# Patient Record
Sex: FEMALE | Race: ASIAN | NOT HISPANIC OR LATINO
[De-identification: names, ages, dates, MRNs, and addresses within clinical notes are randomized per-mention and may not be internally consistent; named-entity substitution may affect disease eponyms.]

---

## 2019-06-19 PROBLEM — Z00.00 ENCOUNTER FOR PREVENTIVE HEALTH EXAMINATION: Status: ACTIVE | Noted: 2019-06-19

## 2019-07-06 ENCOUNTER — RECORD ABSTRACTING (OUTPATIENT)
Age: 24
End: 2019-07-06

## 2019-07-06 DIAGNOSIS — Z86.32 PERSONAL HISTORY OF GESTATIONAL DIABETES: ICD-10-CM

## 2019-07-06 LAB — CYTOLOGY CVX/VAG DOC THIN PREP: NORMAL

## 2019-07-09 ENCOUNTER — APPOINTMENT (OUTPATIENT)
Dept: OBGYN | Facility: CLINIC | Age: 24
End: 2019-07-09
Payer: COMMERCIAL

## 2019-07-09 VITALS
HEIGHT: 63 IN | BODY MASS INDEX: 28.35 KG/M2 | DIASTOLIC BLOOD PRESSURE: 61 MMHG | WEIGHT: 160 LBS | OXYGEN SATURATION: 98 % | SYSTOLIC BLOOD PRESSURE: 100 MMHG | TEMPERATURE: 97 F | HEART RATE: 95 BPM

## 2019-07-09 PROCEDURE — 99213 OFFICE O/P EST LOW 20 MIN: CPT | Mod: TH

## 2019-07-22 ENCOUNTER — OUTPATIENT (OUTPATIENT)
Dept: OUTPATIENT SERVICES | Age: 24
LOS: 1 days | Discharge: HOME | End: 2019-07-22

## 2019-07-22 ENCOUNTER — ASOB RESULT (OUTPATIENT)
Age: 24
End: 2019-07-22

## 2019-07-22 ENCOUNTER — APPOINTMENT (OUTPATIENT)
Dept: ANTEPARTUM | Facility: CLINIC | Age: 24
End: 2019-07-22
Payer: COMMERCIAL

## 2019-07-22 PROCEDURE — 36415 COLL VENOUS BLD VENIPUNCTURE: CPT

## 2019-07-22 PROCEDURE — 76811 OB US DETAILED SNGL FETUS: CPT

## 2019-07-22 PROCEDURE — 99201 OFFICE OUTPATIENT NEW 10 MINUTES: CPT | Mod: 25

## 2019-07-23 ENCOUNTER — NON-APPOINTMENT (OUTPATIENT)
Age: 24
End: 2019-07-23

## 2019-07-23 ENCOUNTER — APPOINTMENT (OUTPATIENT)
Dept: PEDIATRIC CARDIOLOGY | Facility: CLINIC | Age: 24
End: 2019-07-23
Payer: COMMERCIAL

## 2019-07-23 DIAGNOSIS — O24.410 GESTATIONAL DIABETES MELLITUS IN PREGNANCY, DIET CONTROLLED: ICD-10-CM

## 2019-07-23 PROCEDURE — 99213 OFFICE O/P EST LOW 20 MIN: CPT

## 2019-07-23 PROCEDURE — 76825 ECHO EXAM OF FETAL HEART: CPT

## 2019-07-23 PROCEDURE — 76820 UMBILICAL ARTERY ECHO: CPT

## 2019-07-23 PROCEDURE — 99203 OFFICE O/P NEW LOW 30 MIN: CPT | Mod: 25

## 2019-07-23 PROCEDURE — 93325 DOPPLER ECHO COLOR FLOW MAPG: CPT | Mod: 59

## 2019-07-23 PROCEDURE — 76827 ECHO EXAM OF FETAL HEART: CPT

## 2019-07-26 ENCOUNTER — NON-APPOINTMENT (OUTPATIENT)
Age: 24
End: 2019-07-26

## 2019-07-26 PROBLEM — O24.410 DIET CONTROLLED GESTATIONAL DIABETES MELLITUS (GDM), ANTEPARTUM: Status: RESOLVED | Noted: 2019-07-26 | Resolved: 2019-07-26

## 2019-07-27 ENCOUNTER — TRANSCRIPTION ENCOUNTER (OUTPATIENT)
Age: 24
End: 2019-07-27

## 2019-08-08 ENCOUNTER — NON-APPOINTMENT (OUTPATIENT)
Age: 24
End: 2019-08-08

## 2019-08-08 ENCOUNTER — APPOINTMENT (OUTPATIENT)
Dept: OBGYN | Facility: CLINIC | Age: 24
End: 2019-08-08
Payer: COMMERCIAL

## 2019-08-08 VITALS
HEART RATE: 109 BPM | DIASTOLIC BLOOD PRESSURE: 70 MMHG | BODY MASS INDEX: 29.41 KG/M2 | WEIGHT: 166 LBS | HEIGHT: 63 IN | SYSTOLIC BLOOD PRESSURE: 105 MMHG | OXYGEN SATURATION: 98 %

## 2019-08-08 PROCEDURE — 99213 OFFICE O/P EST LOW 20 MIN: CPT | Mod: TH

## 2019-08-21 ENCOUNTER — ASOB RESULT (OUTPATIENT)
Age: 24
End: 2019-08-21

## 2019-08-21 ENCOUNTER — APPOINTMENT (OUTPATIENT)
Dept: ANTEPARTUM | Facility: CLINIC | Age: 24
End: 2019-08-21
Payer: COMMERCIAL

## 2019-08-21 PROCEDURE — 76816 OB US FOLLOW-UP PER FETUS: CPT

## 2019-09-10 ENCOUNTER — NON-APPOINTMENT (OUTPATIENT)
Age: 24
End: 2019-09-10

## 2019-09-10 ENCOUNTER — APPOINTMENT (OUTPATIENT)
Dept: OBGYN | Facility: CLINIC | Age: 24
End: 2019-09-10
Payer: COMMERCIAL

## 2019-09-10 ENCOUNTER — APPOINTMENT (OUTPATIENT)
Dept: PEDIATRIC CARDIOLOGY | Facility: CLINIC | Age: 24
End: 2019-09-10
Payer: COMMERCIAL

## 2019-09-10 VITALS
WEIGHT: 172 LBS | HEIGHT: 63 IN | SYSTOLIC BLOOD PRESSURE: 109 MMHG | DIASTOLIC BLOOD PRESSURE: 70 MMHG | BODY MASS INDEX: 30.48 KG/M2

## 2019-09-10 LAB
BASOPHILS # BLD AUTO: 0.04 K/UL
BASOPHILS NFR BLD AUTO: 0.4 %
EOSINOPHIL # BLD AUTO: 0.42 K/UL
EOSINOPHIL NFR BLD AUTO: 4.1 %
GLUCOSE 1H P 50 G GLC PO SERPL-MCNC: 121 MG/DL
HCT VFR BLD CALC: 33.4 %
HGB BLD-MCNC: 10.5 G/DL
IMM GRANULOCYTES NFR BLD AUTO: 1 %
LYMPHOCYTES # BLD AUTO: 1.81 K/UL
LYMPHOCYTES NFR BLD AUTO: 17.8 %
MAN DIFF?: NORMAL
MCHC RBC-ENTMCNC: 24.6 PG
MCHC RBC-ENTMCNC: 31.4 GM/DL
MCV RBC AUTO: 78.2 FL
MONOCYTES # BLD AUTO: 0.56 K/UL
MONOCYTES NFR BLD AUTO: 5.5 %
NEUTROPHILS # BLD AUTO: 7.25 K/UL
NEUTROPHILS NFR BLD AUTO: 71.2 %
PLATELET # BLD AUTO: 247 K/UL
RBC # BLD: 4.27 M/UL
RBC # FLD: 13.5 %
WBC # FLD AUTO: 10.18 K/UL

## 2019-09-10 PROCEDURE — 76828 ECHO EXAM OF FETAL HEART: CPT

## 2019-09-10 PROCEDURE — 76820 UMBILICAL ARTERY ECHO: CPT

## 2019-09-10 PROCEDURE — 99214 OFFICE O/P EST MOD 30 MIN: CPT | Mod: TH

## 2019-09-10 PROCEDURE — 99213 OFFICE O/P EST LOW 20 MIN: CPT | Mod: 25

## 2019-09-10 PROCEDURE — 93325 DOPPLER ECHO COLOR FLOW MAPG: CPT | Mod: 59

## 2019-09-10 PROCEDURE — 76826 ECHO EXAM OF FETAL HEART: CPT

## 2019-09-11 LAB
MEV IGG FLD QL IA: 5.2 AU/ML
MEV IGG+IGM SER-IMP: NEGATIVE
VZV AB TITR SER: POSITIVE
VZV IGG SER IF-ACNC: 1035 INDEX

## 2019-09-13 LAB — MUV IGM SER QL IA: <0.8 AU

## 2019-09-18 ENCOUNTER — APPOINTMENT (OUTPATIENT)
Dept: ANTEPARTUM | Facility: CLINIC | Age: 24
End: 2019-09-18
Payer: COMMERCIAL

## 2019-09-18 ENCOUNTER — ASOB RESULT (OUTPATIENT)
Age: 24
End: 2019-09-18

## 2019-09-18 PROCEDURE — 76816 OB US FOLLOW-UP PER FETUS: CPT

## 2019-09-18 PROCEDURE — 76819 FETAL BIOPHYS PROFIL W/O NST: CPT

## 2019-09-23 ENCOUNTER — APPOINTMENT (OUTPATIENT)
Dept: OBGYN | Facility: CLINIC | Age: 24
End: 2019-09-23
Payer: COMMERCIAL

## 2019-09-23 ENCOUNTER — NON-APPOINTMENT (OUTPATIENT)
Age: 24
End: 2019-09-23

## 2019-09-23 VITALS
HEIGHT: 63 IN | DIASTOLIC BLOOD PRESSURE: 69 MMHG | BODY MASS INDEX: 31.54 KG/M2 | SYSTOLIC BLOOD PRESSURE: 103 MMHG | WEIGHT: 178 LBS

## 2019-09-23 PROCEDURE — 99213 OFFICE O/P EST LOW 20 MIN: CPT | Mod: TH

## 2019-09-23 RX ORDER — INFLUENZA A VIRUS A/CALIFORNIA/7/2009 X-179A (H1N1) ANTIGEN (FORMALDEHYDE INACTIVATED), INFLUENZA A VIRUS A/HONG KONG/4801/2014 X-263B (H3N2) ANTIGEN (FORMALDEHYDE INACTIVATED), AND INFLUENZA B VIRUS B/BRISBANE/60/2008 ANTIGEN (FORMALDEHYDE INACTIVATED) 60; 60; 60 UG/.5ML; UG/.5ML; UG/.5ML
INJECTION, SUSPENSION INTRAMUSCULAR
Qty: 1 | Refills: 0 | Status: DISCONTINUED | COMMUNITY
Start: 2019-09-10 | End: 2019-09-23

## 2019-10-09 ENCOUNTER — NON-APPOINTMENT (OUTPATIENT)
Age: 24
End: 2019-10-09

## 2019-10-09 ENCOUNTER — ASOB RESULT (OUTPATIENT)
Age: 24
End: 2019-10-09

## 2019-10-09 ENCOUNTER — APPOINTMENT (OUTPATIENT)
Dept: ANTEPARTUM | Facility: CLINIC | Age: 24
End: 2019-10-09
Payer: COMMERCIAL

## 2019-10-09 ENCOUNTER — APPOINTMENT (OUTPATIENT)
Dept: OBGYN | Facility: CLINIC | Age: 24
End: 2019-10-09
Payer: COMMERCIAL

## 2019-10-09 VITALS
HEIGHT: 63 IN | BODY MASS INDEX: 31.54 KG/M2 | SYSTOLIC BLOOD PRESSURE: 109 MMHG | DIASTOLIC BLOOD PRESSURE: 71 MMHG | WEIGHT: 178 LBS

## 2019-10-09 PROCEDURE — 99213 OFFICE O/P EST LOW 20 MIN: CPT | Mod: TH

## 2019-10-09 PROCEDURE — 76816 OB US FOLLOW-UP PER FETUS: CPT

## 2019-10-09 PROCEDURE — 76819 FETAL BIOPHYS PROFIL W/O NST: CPT

## 2019-10-23 ENCOUNTER — NON-APPOINTMENT (OUTPATIENT)
Age: 24
End: 2019-10-23

## 2019-10-23 ENCOUNTER — APPOINTMENT (OUTPATIENT)
Dept: OBGYN | Facility: CLINIC | Age: 24
End: 2019-10-23
Payer: COMMERCIAL

## 2019-10-23 VITALS
HEIGHT: 63 IN | BODY MASS INDEX: 31.54 KG/M2 | WEIGHT: 178 LBS | DIASTOLIC BLOOD PRESSURE: 70 MMHG | SYSTOLIC BLOOD PRESSURE: 108 MMHG

## 2019-10-23 PROCEDURE — 99213 OFFICE O/P EST LOW 20 MIN: CPT | Mod: TH

## 2019-10-25 ENCOUNTER — APPOINTMENT (OUTPATIENT)
Dept: PEDIATRIC CARDIOLOGY | Facility: CLINIC | Age: 24
End: 2019-10-25
Payer: COMMERCIAL

## 2019-10-25 PROCEDURE — 76820 UMBILICAL ARTERY ECHO: CPT

## 2019-10-25 PROCEDURE — 99213 OFFICE O/P EST LOW 20 MIN: CPT | Mod: 25

## 2019-10-25 PROCEDURE — 76826 ECHO EXAM OF FETAL HEART: CPT

## 2019-10-25 PROCEDURE — 76828 ECHO EXAM OF FETAL HEART: CPT

## 2019-10-25 PROCEDURE — 93325 DOPPLER ECHO COLOR FLOW MAPG: CPT | Mod: 59

## 2019-11-04 ENCOUNTER — ASOB RESULT (OUTPATIENT)
Age: 24
End: 2019-11-04

## 2019-11-04 ENCOUNTER — APPOINTMENT (OUTPATIENT)
Dept: ANTEPARTUM | Facility: CLINIC | Age: 24
End: 2019-11-04
Payer: MEDICAID

## 2019-11-04 PROCEDURE — 76819 FETAL BIOPHYS PROFIL W/O NST: CPT

## 2019-11-04 PROCEDURE — 76816 OB US FOLLOW-UP PER FETUS: CPT

## 2019-11-06 ENCOUNTER — NON-APPOINTMENT (OUTPATIENT)
Age: 24
End: 2019-11-06

## 2019-11-06 ENCOUNTER — APPOINTMENT (OUTPATIENT)
Dept: OBGYN | Facility: CLINIC | Age: 24
End: 2019-11-06
Payer: MEDICAID

## 2019-11-06 VITALS
WEIGHT: 185 LBS | DIASTOLIC BLOOD PRESSURE: 73 MMHG | SYSTOLIC BLOOD PRESSURE: 113 MMHG | HEIGHT: 63 IN | BODY MASS INDEX: 32.78 KG/M2

## 2019-11-06 PROCEDURE — 99213 OFFICE O/P EST LOW 20 MIN: CPT | Mod: TH

## 2019-11-13 ENCOUNTER — APPOINTMENT (OUTPATIENT)
Dept: OBGYN | Facility: CLINIC | Age: 24
End: 2019-11-13
Payer: MEDICAID

## 2019-11-13 ENCOUNTER — NON-APPOINTMENT (OUTPATIENT)
Age: 24
End: 2019-11-13

## 2019-11-13 VITALS
SYSTOLIC BLOOD PRESSURE: 122 MMHG | WEIGHT: 182 LBS | HEIGHT: 63 IN | DIASTOLIC BLOOD PRESSURE: 71 MMHG | BODY MASS INDEX: 32.25 KG/M2

## 2019-11-13 PROCEDURE — 99213 OFFICE O/P EST LOW 20 MIN: CPT | Mod: TH

## 2019-11-15 ENCOUNTER — OUTPATIENT (OUTPATIENT)
Dept: OUTPATIENT SERVICES | Facility: HOSPITAL | Age: 24
LOS: 1 days | End: 2019-11-15

## 2019-11-15 VITALS
OXYGEN SATURATION: 98 % | WEIGHT: 179.9 LBS | DIASTOLIC BLOOD PRESSURE: 62 MMHG | HEART RATE: 93 BPM | HEIGHT: 64 IN | TEMPERATURE: 98 F | SYSTOLIC BLOOD PRESSURE: 102 MMHG | RESPIRATION RATE: 16 BRPM

## 2019-11-15 DIAGNOSIS — O34.219 MATERNAL CARE FOR UNSPECIFIED TYPE SCAR FROM PREVIOUS CESAREAN DELIVERY: ICD-10-CM

## 2019-11-15 LAB
APPEARANCE UR: SIGNIFICANT CHANGE UP
BACTERIA # UR AUTO: NEGATIVE — SIGNIFICANT CHANGE UP
BILIRUB UR-MCNC: NEGATIVE — SIGNIFICANT CHANGE UP
BLD GP AB SCN SERPL QL: NEGATIVE — SIGNIFICANT CHANGE UP
BLOOD UR QL VISUAL: NEGATIVE — SIGNIFICANT CHANGE UP
C TRACH RRNA SPEC QL NAA+PROBE: NOT DETECTED
COLOR SPEC: SIGNIFICANT CHANGE UP
GLUCOSE UR-MCNC: NEGATIVE — SIGNIFICANT CHANGE UP
HCT VFR BLD CALC: 35 % — SIGNIFICANT CHANGE UP (ref 34.5–45)
HGB BLD-MCNC: 10.7 G/DL — LOW (ref 11.5–15.5)
HYALINE CASTS # UR AUTO: SIGNIFICANT CHANGE UP
KETONES UR-MCNC: NEGATIVE — SIGNIFICANT CHANGE UP
LEUKOCYTE ESTERASE UR-ACNC: NEGATIVE — SIGNIFICANT CHANGE UP
MCHC RBC-ENTMCNC: 21.2 PG — LOW (ref 27–34)
MCHC RBC-ENTMCNC: 30.6 % — LOW (ref 32–36)
MCV RBC AUTO: 69.4 FL — LOW (ref 80–100)
N GONORRHOEA RRNA SPEC QL NAA+PROBE: NOT DETECTED
NITRITE UR-MCNC: NEGATIVE — SIGNIFICANT CHANGE UP
NRBC # FLD: 0 K/UL — SIGNIFICANT CHANGE UP (ref 0–0)
PH UR: 6 — SIGNIFICANT CHANGE UP (ref 5–8)
PLATELET # BLD AUTO: 345 K/UL — SIGNIFICANT CHANGE UP (ref 150–400)
PMV BLD: 10.3 FL — SIGNIFICANT CHANGE UP (ref 7–13)
PROT UR-MCNC: NEGATIVE — SIGNIFICANT CHANGE UP
RBC # BLD: 5.04 M/UL — SIGNIFICANT CHANGE UP (ref 3.8–5.2)
RBC # FLD: 17 % — HIGH (ref 10.3–14.5)
RBC CASTS # UR COMP ASSIST: SIGNIFICANT CHANGE UP (ref 0–?)
RH IG SCN BLD-IMP: POSITIVE — SIGNIFICANT CHANGE UP
SOURCE AMPLIFICATION: NORMAL
SP GR SPEC: 1.01 — SIGNIFICANT CHANGE UP (ref 1–1.04)
SQUAMOUS # UR AUTO: SIGNIFICANT CHANGE UP
UROBILINOGEN FLD QL: NORMAL — SIGNIFICANT CHANGE UP
WBC # BLD: 10.53 K/UL — HIGH (ref 3.8–10.5)
WBC # FLD AUTO: 10.53 K/UL — HIGH (ref 3.8–10.5)
WBC UR QL: SIGNIFICANT CHANGE UP (ref 0–?)

## 2019-11-15 NOTE — OB PST NOTE - NS_OBGYNHISTORY_OBGYN_ALL_OB_FT
24 yrs old female 36 weeks and 6 days pregnant ,  presents for preop eval to have C section on 19. Pt had C section on 2018 due to failure to progression of labor.

## 2019-11-15 NOTE — OB PST NOTE - PROBLEM SELECTOR PLAN 1
24 yrs old female 36 weeks and 6 days pregnant ,  presents for preop eval to have C section on 19.   Labs pending,  Preop instructions provided to pt.  Chlorhexidine scrubs provided to pt with instructions

## 2019-11-15 NOTE — OB PST NOTE - NSHPREVIEWOFSYSTEMS_GEN_ALL_CORE
General: No fever, chills, sweating, anorexia, weight loss or weight gain. No polyphagia, polyurea, polydypsia, malaise, or fatigue    Skin: No rashes, itching, or dryness. No change in size/color of moles. No tumors, brittle nails, pitted nails, or hair loss    Breast: No tenderness, lumps, or nipple discharge      Ophthalmologic: No diplopia, photophobia, lacrimation, blurred Vision , or eye discharge    ENMT: Symptoms: No hearing difficulty, ear pain, tinnitus, or vertigo. No sinus symptoms, nasal congestion, nasal   discharge, or nasal obstruction    Respiratory and Thorax: No wheezing, dyspnea, cough, hemoptysis, or pleuritic chest pPain     Cardiovascular: No chest pain, palpitations, dyspnea on exertion, orthopnea, paroxysmal nocturnal dyspnea,   peripheral edema, or claudication    Gastrointestinal: nausea, vomiting 1st trimester of pregnancy, diarrhea, constipation, change in bowel habits, flatulence, abdominal pain, or melena    Genitourinary/ Pelvis: No hematuria, renal colic, or flank pain.  No urine discoloration, incontinence, dysuria, or urinary hesitancy. Normal urinary frequency. No nocturia, abnormal vaginal bleeding, vaginal discharge, spotting, pelvic pain, or vaginal leakage    Musculoskeletal: No arthralgia, arthritis, joint swelling, muscle cramping, muscle weakness, neck pain, arm pain, or leg pain, slight back pain    Neurological: No transient paralysis, weakness, paresthesias, or seizures. No syncope, tremors, vertigo, loss of sensation, difficulty walking, loss of consciousness, hemiparesis, confusion, or facial palsy    Psychiatric: No suicidal ideation, depression, anxiety, insomnia, memory loss, paranoia, mood swings, agitation, hallucinations, or hyperactivity    Hematology: No gum bleeding, nose bleeding, or skin lumps    Lymphatic: No enlarged or tender lymph nodes. No extremity swelling    Endocrine: No heat or cold intolerance    Immunologic: No recurrent or persistent infections

## 2019-11-15 NOTE — OB PST NOTE - NSANTHOSAYNRD_GEN_A_CORE
No. CHRIS screening performed.  STOP BANG Legend: 0-2 = LOW Risk; 3-4 = INTERMEDIATE Risk; 5-8 = HIGH Risk/never tested

## 2019-11-15 NOTE — OB PST NOTE - NSHPPHYSICALEXAM_GEN_ALL_CORE

## 2019-11-16 LAB
GP B STREP DNA SPEC QL NAA+PROBE: NORMAL
GP B STREP DNA SPEC QL NAA+PROBE: NOT DETECTED
SOURCE GBS: NORMAL
T PALLIDUM AB TITR SER: NEGATIVE — SIGNIFICANT CHANGE UP

## 2019-11-20 ENCOUNTER — NON-APPOINTMENT (OUTPATIENT)
Age: 24
End: 2019-11-20

## 2019-11-20 ENCOUNTER — APPOINTMENT (OUTPATIENT)
Dept: OBGYN | Facility: CLINIC | Age: 24
End: 2019-11-20
Payer: MEDICAID

## 2019-11-20 VITALS
BODY MASS INDEX: 32.96 KG/M2 | WEIGHT: 186 LBS | DIASTOLIC BLOOD PRESSURE: 68 MMHG | HEIGHT: 63 IN | SYSTOLIC BLOOD PRESSURE: 111 MMHG

## 2019-11-20 PROCEDURE — 99213 OFFICE O/P EST LOW 20 MIN: CPT | Mod: TH

## 2019-11-21 PROBLEM — Z78.9 OTHER SPECIFIED HEALTH STATUS: Chronic | Status: ACTIVE | Noted: 2019-11-15

## 2019-11-27 ENCOUNTER — APPOINTMENT (OUTPATIENT)
Dept: OBGYN | Facility: CLINIC | Age: 24
End: 2019-11-27
Payer: MEDICAID

## 2019-11-27 VITALS
HEIGHT: 63 IN | BODY MASS INDEX: 33.13 KG/M2 | DIASTOLIC BLOOD PRESSURE: 73 MMHG | WEIGHT: 187 LBS | SYSTOLIC BLOOD PRESSURE: 121 MMHG

## 2019-11-27 PROCEDURE — 99214 OFFICE O/P EST MOD 30 MIN: CPT | Mod: TH

## 2019-12-01 ENCOUNTER — TRANSCRIPTION ENCOUNTER (OUTPATIENT)
Age: 24
End: 2019-12-01

## 2019-12-02 ENCOUNTER — TRANSCRIPTION ENCOUNTER (OUTPATIENT)
Age: 24
End: 2019-12-02

## 2019-12-02 ENCOUNTER — RESULT REVIEW (OUTPATIENT)
Age: 24
End: 2019-12-02

## 2019-12-02 ENCOUNTER — INPATIENT (INPATIENT)
Facility: HOSPITAL | Age: 24
LOS: 2 days | Discharge: HOME | End: 2019-12-05
Attending: OBSTETRICS & GYNECOLOGY | Admitting: OBSTETRICS & GYNECOLOGY
Payer: MEDICAID

## 2019-12-02 VITALS — SYSTOLIC BLOOD PRESSURE: 114 MMHG | HEART RATE: 87 BPM | DIASTOLIC BLOOD PRESSURE: 71 MMHG | TEMPERATURE: 98 F

## 2019-12-02 DIAGNOSIS — Z98.891 HISTORY OF UTERINE SCAR FROM PREVIOUS SURGERY: ICD-10-CM

## 2019-12-02 DIAGNOSIS — O34.219 MATERNAL CARE FOR UNSPECIFIED TYPE SCAR FROM PREVIOUS CESAREAN DELIVERY: ICD-10-CM

## 2019-12-02 LAB
BLD GP AB SCN SERPL QL: NEGATIVE — SIGNIFICANT CHANGE UP
HCT VFR BLD CALC: 32 % — LOW (ref 34.5–45)
HGB BLD-MCNC: 10.2 G/DL — LOW (ref 11.5–15.5)
MCHC RBC-ENTMCNC: 21.4 PG — LOW (ref 27–34)
MCHC RBC-ENTMCNC: 31.9 % — LOW (ref 32–36)
MCV RBC AUTO: 67.2 FL — LOW (ref 80–100)
NRBC # FLD: 0 K/UL — SIGNIFICANT CHANGE UP (ref 0–0)
PLATELET # BLD AUTO: 303 K/UL — SIGNIFICANT CHANGE UP (ref 150–400)
PMV BLD: 9.8 FL — SIGNIFICANT CHANGE UP (ref 7–13)
RBC # BLD: 4.76 M/UL — SIGNIFICANT CHANGE UP (ref 3.8–5.2)
RBC # FLD: 18.6 % — HIGH (ref 10.3–14.5)
RH IG SCN BLD-IMP: POSITIVE — SIGNIFICANT CHANGE UP
WBC # BLD: 9.62 K/UL — SIGNIFICANT CHANGE UP (ref 3.8–10.5)
WBC # FLD AUTO: 9.62 K/UL — SIGNIFICANT CHANGE UP (ref 3.8–10.5)

## 2019-12-02 PROCEDURE — 59514 CESAREAN DELIVERY ONLY: CPT | Mod: U9

## 2019-12-02 PROCEDURE — 88307 TISSUE EXAM BY PATHOLOGIST: CPT | Mod: 26

## 2019-12-02 RX ORDER — OXYCODONE HYDROCHLORIDE 5 MG/1
5 TABLET ORAL ONCE
Refills: 0 | Status: DISCONTINUED | OUTPATIENT
Start: 2019-12-02 | End: 2019-12-05

## 2019-12-02 RX ORDER — FAMOTIDINE 10 MG/ML
20 INJECTION INTRAVENOUS ONCE
Refills: 0 | Status: COMPLETED | OUTPATIENT
Start: 2019-12-02 | End: 2019-12-02

## 2019-12-02 RX ORDER — SIMETHICONE 80 MG/1
80 TABLET, CHEWABLE ORAL EVERY 4 HOURS
Refills: 0 | Status: DISCONTINUED | OUTPATIENT
Start: 2019-12-02 | End: 2019-12-05

## 2019-12-02 RX ORDER — OXYTOCIN 10 UNIT/ML
333.33 VIAL (ML) INJECTION
Qty: 20 | Refills: 0 | Status: DISCONTINUED | OUTPATIENT
Start: 2019-12-02 | End: 2019-12-03

## 2019-12-02 RX ORDER — HYDROMORPHONE HYDROCHLORIDE 2 MG/ML
1 INJECTION INTRAMUSCULAR; INTRAVENOUS; SUBCUTANEOUS
Refills: 0 | Status: DISCONTINUED | OUTPATIENT
Start: 2019-12-02 | End: 2019-12-03

## 2019-12-02 RX ORDER — SODIUM CHLORIDE 9 MG/ML
1000 INJECTION, SOLUTION INTRAVENOUS
Refills: 0 | Status: DISCONTINUED | OUTPATIENT
Start: 2019-12-02 | End: 2019-12-03

## 2019-12-02 RX ORDER — IBUPROFEN 200 MG
600 TABLET ORAL EVERY 6 HOURS
Refills: 0 | Status: COMPLETED | OUTPATIENT
Start: 2019-12-02 | End: 2020-10-30

## 2019-12-02 RX ORDER — HEPARIN SODIUM 5000 [USP'U]/ML
5000 INJECTION INTRAVENOUS; SUBCUTANEOUS EVERY 12 HOURS
Refills: 0 | Status: DISCONTINUED | OUTPATIENT
Start: 2019-12-02 | End: 2019-12-05

## 2019-12-02 RX ORDER — OXYCODONE HYDROCHLORIDE 5 MG/1
5 TABLET ORAL
Refills: 0 | Status: COMPLETED | OUTPATIENT
Start: 2019-12-02 | End: 2019-12-09

## 2019-12-02 RX ORDER — SODIUM CHLORIDE 9 MG/ML
1000 INJECTION, SOLUTION INTRAVENOUS ONCE
Refills: 0 | Status: COMPLETED | OUTPATIENT
Start: 2019-12-02 | End: 2019-12-02

## 2019-12-02 RX ORDER — ACETAMINOPHEN 500 MG
975 TABLET ORAL EVERY 6 HOURS
Refills: 0 | Status: DISCONTINUED | OUTPATIENT
Start: 2019-12-02 | End: 2019-12-05

## 2019-12-02 RX ORDER — LANOLIN
1 OINTMENT (GRAM) TOPICAL EVERY 6 HOURS
Refills: 0 | Status: DISCONTINUED | OUTPATIENT
Start: 2019-12-02 | End: 2019-12-05

## 2019-12-02 RX ORDER — GLYCERIN ADULT
1 SUPPOSITORY, RECTAL RECTAL AT BEDTIME
Refills: 0 | Status: DISCONTINUED | OUTPATIENT
Start: 2019-12-02 | End: 2019-12-05

## 2019-12-02 RX ORDER — SODIUM CHLORIDE 9 MG/ML
1000 INJECTION, SOLUTION INTRAVENOUS
Refills: 0 | Status: DISCONTINUED | OUTPATIENT
Start: 2019-12-02 | End: 2019-12-02

## 2019-12-02 RX ORDER — CITRIC ACID/SODIUM CITRATE 300-500 MG
30 SOLUTION, ORAL ORAL ONCE
Refills: 0 | Status: COMPLETED | OUTPATIENT
Start: 2019-12-02 | End: 2019-12-02

## 2019-12-02 RX ORDER — DIPHENHYDRAMINE HCL 50 MG
25 CAPSULE ORAL EVERY 6 HOURS
Refills: 0 | Status: DISCONTINUED | OUTPATIENT
Start: 2019-12-02 | End: 2019-12-05

## 2019-12-02 RX ORDER — ONDANSETRON 8 MG/1
4 TABLET, FILM COATED ORAL EVERY 6 HOURS
Refills: 0 | Status: DISCONTINUED | OUTPATIENT
Start: 2019-12-02 | End: 2019-12-05

## 2019-12-02 RX ORDER — MAGNESIUM HYDROXIDE 400 MG/1
30 TABLET, CHEWABLE ORAL
Refills: 0 | Status: DISCONTINUED | OUTPATIENT
Start: 2019-12-02 | End: 2019-12-05

## 2019-12-02 RX ORDER — METOCLOPRAMIDE HCL 10 MG
10 TABLET ORAL ONCE
Refills: 0 | Status: COMPLETED | OUTPATIENT
Start: 2019-12-02 | End: 2019-12-02

## 2019-12-02 RX ORDER — KETOROLAC TROMETHAMINE 30 MG/ML
30 SYRINGE (ML) INJECTION EVERY 6 HOURS
Refills: 0 | Status: DISCONTINUED | OUTPATIENT
Start: 2019-12-02 | End: 2019-12-03

## 2019-12-02 RX ORDER — TETANUS TOXOID, REDUCED DIPHTHERIA TOXOID AND ACELLULAR PERTUSSIS VACCINE, ADSORBED 5; 2.5; 8; 8; 2.5 [IU]/.5ML; [IU]/.5ML; UG/.5ML; UG/.5ML; UG/.5ML
0.5 SUSPENSION INTRAMUSCULAR ONCE
Refills: 0 | Status: DISCONTINUED | OUTPATIENT
Start: 2019-12-02 | End: 2019-12-05

## 2019-12-02 RX ADMIN — Medication 10 MILLIGRAM(S): at 08:45

## 2019-12-02 RX ADMIN — Medication 30 MILLIGRAM(S): at 17:33

## 2019-12-02 RX ADMIN — FAMOTIDINE 20 MILLIGRAM(S): 10 INJECTION INTRAVENOUS at 08:45

## 2019-12-02 RX ADMIN — SODIUM CHLORIDE 200 MILLILITER(S): 9 INJECTION, SOLUTION INTRAVENOUS at 08:46

## 2019-12-02 RX ADMIN — SODIUM CHLORIDE 75 MILLILITER(S): 9 INJECTION, SOLUTION INTRAVENOUS at 12:30

## 2019-12-02 RX ADMIN — Medication 25 MILLIGRAM(S): at 13:21

## 2019-12-02 RX ADMIN — Medication 1000 MILLIUNIT(S)/MIN: at 12:29

## 2019-12-02 RX ADMIN — SODIUM CHLORIDE 2000 MILLILITER(S): 9 INJECTION, SOLUTION INTRAVENOUS at 08:45

## 2019-12-02 RX ADMIN — Medication 30 MILLILITER(S): at 08:45

## 2019-12-02 RX ADMIN — HEPARIN SODIUM 5000 UNIT(S): 5000 INJECTION INTRAVENOUS; SUBCUTANEOUS at 17:33

## 2019-12-02 NOTE — LACTATION INITIAL EVALUATION - LACTATION INTERVENTIONS
Infant less than 24 hours old. Pt. states she is doing both breast and bottle feeding.  Pt. educated in the importance of observing for feeding cues and to feed based on those cues, Discussed the nutritional needs of the baby, how many wet and dirty diapers to expect, along with the amount of times the baby needs to be placed on the breast at this time. Recommended more breastfeeding and less formula feeding. Supplementation risks discussed. Strategies reviewed on getting baby on breast more often. Instructed mother in use of breastfeeding log to document feeds along with wet and dirty diapers. Pt. taught that the use of the breastfeeding log will allow her to visualize what the baby is receiving from breastfeeding. Pt. informed of the accessibility of breastfeeding apps to document feedings along with wet and dirty diapers as another alternative for paper log. Instructed in hand expression with good return demonstration. + colostrum noted. Pt. informed of interactive learning liliam available to use with the New Beginnings book. Interactive component demonstrated utilizing the section for hand expression. Verbalized understanding. Pt. informed of availability of lactation through the night and encouraged to call for assistance prn. RN made aware of plan and to assist with further feedings as necessary. Instructed to request assistance prn.

## 2019-12-02 NOTE — OB PROVIDER H&P - ATTENDING COMMENTS
24yrs olg edc2019 ega 39weeks came for repeat  section  vitals stable,afebrile bp 110/78,pulse 82/minute  per abdomen soft,bs present,uterus vikash occasionally,fetal heart rate 140s,catogary 1 tracing  pelvic exam deferred  assessement 39weeks with previous  section  plan for repeat  section.risks&benifits of  section are explained to patient.risks of infection,hemorrhage,possible,hemorrhage,possible blood transfusion,possible thromboembolism,injury to adjacent organs are explained to patient.she understands her condition&agreed for proposed procedure

## 2019-12-02 NOTE — OB RN DELIVERY SUMMARY - NS_SEPSISRSKCALC_OBGYN_ALL_OB_FT
EOS calculated successfully. EOS Risk Factor: 0.5/1000 live births ([facility] national incidence); GA=39w2d; Temp=98.24; ROM=0.017; GBS='Negative'; Antibiotics='No antibiotics or any antibiotics < 2 hrs prior to birth'

## 2019-12-02 NOTE — DISCHARGE NOTE OB - CARE PLAN
Principal Discharge DX:	Delivery by elective  section  Goal:	doing good  Assessment and plan of treatment:	f/u [id number] in 2weeks,regular diet,activity as tolerated

## 2019-12-02 NOTE — OB PROVIDER H&P - NSHPLABSRESULTS_GEN_ALL_CORE
Complete Blood Count (11.15.19 @ 10:15)    WBC Count: 10.53 K/uL      Hemoglobin: 10.7 g/dL    Hematocrit: 35.0 %    Platelet Count - Automated: 345 K/uL        Type + Screen (11.15.19 @ 09:10)    ABO Interpretation: O    Rh Interpretation: Positive    Antibody Screen: Negative

## 2019-12-02 NOTE — DISCHARGE NOTE OB - CARE PROVIDER_API CALL
Alvaro Tatum)  Obstetrics and Gynecology  [address], [address] [place] [address] [place] [address]  Phone: [phone]  Fax: [phone]  Follow Up Time:

## 2019-12-02 NOTE — OB PROVIDER H&P - ASSESSMENT
Admit to L&D  dana Lynette/ Lesli  NPO  routine labs  EFM/TOCO  anesthesia consult  Coral Randhawa PAC  12-02-19 @ 08:41

## 2019-12-02 NOTE — DISCHARGE NOTE OB - PATIENT PORTAL LINK FT
You can access the FollowMyHealth Patient Portal offered by [facility] by registering at the following website: [email or web address]. By joining [email or web address]’s FollowMyHealth portal, you will also be able to view your health information using other applications (apps) compatible with our system.

## 2019-12-02 NOTE — OB PROVIDER H&P - HISTORY OF PRESENT ILLNESS
25yo female   EDC 19  presents@39.2wks for scheduled  rltcs  AP course  unremarkable except for fetal alert, VSD  Denies VB,ROM or UCs today.  +FM

## 2019-12-02 NOTE — DISCHARGE NOTE OB - MEDICATION SUMMARY - MEDICATIONS TO TAKE
I will START or STAY ON the medications listed below when I get home from the hospital:    Actamin 325 mg oral tablet  -- 3 tab(s) by mouth every 6 hours  -- Indication: For  delivery delivered    Alivio 600 mg oral tablet  -- 1 tab(s) by mouth every 6 hours  -- Indication: For  delivery delivered

## 2019-12-02 NOTE — OB NEONATOLOGY/PEDIATRICIAN DELIVERY SUMMARY - NSPEDSNEONOTESA_OBGYN_ALL_OB_FT
at 39+2 WGA delivered by repeat C/S. Pregnancy complicated by the finding of a moderate sized VSD on prenatal echocardiogram. MBT O+, PNL NNI, GBS negative. ROM at delivery with clear fluid. Infant emerged vigorous, received DCC for 30 seconds, and received routine recuscitation. Infant to be evaluated with  echocardiogram in the nursery. Apgars 8/9.

## 2019-12-02 NOTE — OB PROVIDER H&P - NS_OBGYNHISTORY_OBGYN_ALL_OB_FT
24 yrs old female 36 weeks and 6 days pregnant ,  presents for preop eval to have C section on 19. Pt had C section on 2018 due to failure to progression of labor. OBHx:             2018-pLTCS- CAT2-male;9.4#- GDMA

## 2019-12-02 NOTE — OB PROVIDER DELIVERY SUMMARY - NSPROVIDERDELIVERYNOTE_OBGYN_ALL_OB_FT
24yrs old has repeat  section a live 24yrs old has repeat  section a live female chil  with apgar scores8&9.weight of baby 8p4oz.paediatrics present at the time of delivery of baby. placenta came intact with membranes .complications none .estimated blood loss 600ml.urine output 150ml.fluid input 1500ml

## 2019-12-02 NOTE — OB PROVIDER H&P - NSHPPHYSICALEXAM_GEN_ALL_CORE
T(C): 36.8 (12-02-19 @ 08:25), Max: 36.8 (12-02-19 @ 08:13)  HR: 87 (12-02-19 @ 08:25) (87 - 87)  BP: 114/71 (12-02-19 @ 08:25) (114/71 - 114/71)  RR: 12 (12-02-19 @ 08:25) (12 - 12)  SpO2: --Height (cm): 162.56 (12-02-19 @ 08:25)  Weight (kg): 84.8 (12-02-19 @ 08:25)  BMI (kg/m2): 32.1 (12-02-19 @ 08:25)  BSA (m2): 1.9 (12-02-19 @ 08:25)    A&Ox3  Heart: RRR, S1&S2, no S3  Lungs: Clear bilateral to auscultation, good inspiratory /expiratory effort              no rhonchi, no rales  Abd: soft, Gravid, TOCO in place           +pfannenstial scar  EFM: 130 bpm/moderate variabilty/+accelerations/no decelerations/CAT 1  TOCO:  no UC  Ext:  FROM / no edema  Neuro: grossly intact

## 2019-12-02 NOTE — OB RN PATIENT PROFILE - ALERT: PERTINENT HISTORY
Follow up Sonogram for Growth/Non Invasive Prenatal Screen (NIPS)/Ultra Screen at 12 Weeks/1st Trimester Sonogram/20 Week Level II Sonogram/Other

## 2019-12-03 LAB
ANISOCYTOSIS BLD QL: SIGNIFICANT CHANGE UP
BASOPHILS # BLD AUTO: 0.03 K/UL — SIGNIFICANT CHANGE UP (ref 0–0.2)
BASOPHILS NFR BLD AUTO: 0.3 % — SIGNIFICANT CHANGE UP (ref 0–2)
BASOPHILS NFR SPEC: 0 % — SIGNIFICANT CHANGE UP (ref 0–2)
BLASTS # FLD: 0 % — SIGNIFICANT CHANGE UP (ref 0–0)
DACRYOCYTES BLD QL SMEAR: SLIGHT — SIGNIFICANT CHANGE UP
EOSINOPHIL # BLD AUTO: 0.21 K/UL — SIGNIFICANT CHANGE UP (ref 0–0.5)
EOSINOPHIL NFR BLD AUTO: 2.3 % — SIGNIFICANT CHANGE UP (ref 0–6)
EOSINOPHIL NFR FLD: 1.9 % — SIGNIFICANT CHANGE UP (ref 0–6)
GIANT PLATELETS BLD QL SMEAR: PRESENT — SIGNIFICANT CHANGE UP
HCT VFR BLD CALC: 28.1 % — LOW (ref 34.5–45)
HGB BLD-MCNC: 8.5 G/DL — LOW (ref 11.5–15.5)
HYPOCHROMIA BLD QL: SLIGHT — SIGNIFICANT CHANGE UP
IMM GRANULOCYTES NFR BLD AUTO: 0.3 % — SIGNIFICANT CHANGE UP (ref 0–1.5)
LYMPHOCYTES # BLD AUTO: 2.35 K/UL — SIGNIFICANT CHANGE UP (ref 1–3.3)
LYMPHOCYTES # BLD AUTO: 25.2 % — SIGNIFICANT CHANGE UP (ref 13–44)
LYMPHOCYTES NFR SPEC AUTO: 11.8 % — LOW (ref 13–44)
MANUAL SMEAR VERIFICATION: SIGNIFICANT CHANGE UP
MCHC RBC-ENTMCNC: 20.3 PG — LOW (ref 27–34)
MCHC RBC-ENTMCNC: 30.2 % — LOW (ref 32–36)
MCV RBC AUTO: 67.1 FL — LOW (ref 80–100)
METAMYELOCYTES # FLD: 0 % — SIGNIFICANT CHANGE UP (ref 0–1)
MICROCYTES BLD QL: SIGNIFICANT CHANGE UP
MONOCYTES # BLD AUTO: 0.52 K/UL — SIGNIFICANT CHANGE UP (ref 0–0.9)
MONOCYTES NFR BLD AUTO: 5.6 % — SIGNIFICANT CHANGE UP (ref 2–14)
MONOCYTES NFR BLD: 4.9 % — SIGNIFICANT CHANGE UP (ref 2–9)
MYELOCYTES NFR BLD: 0 % — SIGNIFICANT CHANGE UP (ref 0–0)
NEUTROPHIL AB SER-ACNC: 73.5 % — SIGNIFICANT CHANGE UP (ref 43–77)
NEUTROPHILS # BLD AUTO: 6.18 K/UL — SIGNIFICANT CHANGE UP (ref 1.8–7.4)
NEUTROPHILS NFR BLD AUTO: 66.3 % — SIGNIFICANT CHANGE UP (ref 43–77)
NEUTS BAND # BLD: 0 % — SIGNIFICANT CHANGE UP (ref 0–6)
NRBC # BLD: 3 /100WBC — SIGNIFICANT CHANGE UP
NRBC # FLD: 0 K/UL — SIGNIFICANT CHANGE UP (ref 0–0)
OTHER - HEMATOLOGY %: 0 — SIGNIFICANT CHANGE UP
OVALOCYTES BLD QL SMEAR: SLIGHT — SIGNIFICANT CHANGE UP
PLATELET # BLD AUTO: 269 K/UL — SIGNIFICANT CHANGE UP (ref 150–400)
PLATELET COUNT - ESTIMATE: NORMAL — SIGNIFICANT CHANGE UP
PMV BLD: 9.9 FL — SIGNIFICANT CHANGE UP (ref 7–13)
POIKILOCYTOSIS BLD QL AUTO: SLIGHT — SIGNIFICANT CHANGE UP
PROMYELOCYTES # FLD: 0 % — SIGNIFICANT CHANGE UP (ref 0–0)
RBC # BLD: 4.19 M/UL — SIGNIFICANT CHANGE UP (ref 3.8–5.2)
RBC # FLD: 18.5 % — HIGH (ref 10.3–14.5)
VARIANT LYMPHS # BLD: 5.9 % — SIGNIFICANT CHANGE UP
WBC # BLD: 9.32 K/UL — SIGNIFICANT CHANGE UP (ref 3.8–10.5)
WBC # FLD AUTO: 9.32 K/UL — SIGNIFICANT CHANGE UP (ref 3.8–10.5)

## 2019-12-03 RX ORDER — IBUPROFEN 200 MG
1 TABLET ORAL
Qty: 0 | Refills: 0 | DISCHARGE
Start: 2019-12-03

## 2019-12-03 RX ORDER — ASCORBIC ACID 60 MG
500 TABLET,CHEWABLE ORAL DAILY
Refills: 0 | Status: DISCONTINUED | OUTPATIENT
Start: 2019-12-03 | End: 2019-12-05

## 2019-12-03 RX ORDER — ACETAMINOPHEN 500 MG
3 TABLET ORAL
Qty: 0 | Refills: 0 | DISCHARGE
Start: 2019-12-03

## 2019-12-03 RX ORDER — IBUPROFEN 200 MG
600 TABLET ORAL EVERY 6 HOURS
Refills: 0 | Status: DISCONTINUED | OUTPATIENT
Start: 2019-12-03 | End: 2019-12-05

## 2019-12-03 RX ORDER — FERROUS SULFATE 325(65) MG
325 TABLET ORAL THREE TIMES A DAY
Refills: 0 | Status: DISCONTINUED | OUTPATIENT
Start: 2019-12-03 | End: 2019-12-05

## 2019-12-03 RX ADMIN — Medication 975 MILLIGRAM(S): at 21:07

## 2019-12-03 RX ADMIN — HEPARIN SODIUM 5000 UNIT(S): 5000 INJECTION INTRAVENOUS; SUBCUTANEOUS at 06:16

## 2019-12-03 RX ADMIN — Medication 600 MILLIGRAM(S): at 17:24

## 2019-12-03 RX ADMIN — Medication 600 MILLIGRAM(S): at 11:25

## 2019-12-03 RX ADMIN — Medication 325 MILLIGRAM(S): at 21:08

## 2019-12-03 RX ADMIN — Medication 600 MILLIGRAM(S): at 12:00

## 2019-12-03 RX ADMIN — Medication 30 MILLIGRAM(S): at 06:16

## 2019-12-03 RX ADMIN — Medication 600 MILLIGRAM(S): at 23:35

## 2019-12-03 RX ADMIN — Medication 975 MILLIGRAM(S): at 21:40

## 2019-12-03 RX ADMIN — HEPARIN SODIUM 5000 UNIT(S): 5000 INJECTION INTRAVENOUS; SUBCUTANEOUS at 17:23

## 2019-12-03 RX ADMIN — Medication 975 MILLIGRAM(S): at 00:18

## 2019-12-03 NOTE — PROGRESS NOTE ADULT - SUBJECTIVE AND OBJECTIVE BOX
OB Progress Note:  Delivery, POD#1    S: 25yo POD#1 s/p LTCS for repeat . Her pain is well controlled, with only some pain around the surgical site. She is tolerating a regular diet. Had one episode of vomiting after drinking water yesterday afternoon, but was able to eat dinner w/o nausea or vomiting. Not yet passing flatus.     Denies N/V. Denies CP/SOB/lightheadedness/dizziness. She is ambulating without difficulty. Voiding spontaneously. Denies HA, changes in vision, and RUQ pain.     O:   Vital Signs Last 24 Hrs  T(C): 37.1 (03 Dec 2019 06:49), Max: 37.1 (03 Dec 2019 06:49)  T(F): 98.8 (03 Dec 2019 06:49), Max: 98.8 (03 Dec 2019 06:49)  HR: 86 (03 Dec 2019 06:49) (63 - 90)  BP: 102/63 (03 Dec 2019 06:49) (99/62 - 132/98)  BP(mean): 71 (02 Dec 2019 14:00) (63 - 105)  RR: 19 (03 Dec 2019 06:49) (12 - 23)  SpO2: 98% (03 Dec 2019 06:49) (94% - 100%)    Physical exam:  General: NAD  Abdomen: Mildly distended, appropriately tender, incision c/d/i.  Extremities: No erythema, no pitting edema    Labs:  Blood type: O Positive  Rubella IgG: RPR: Negative                          8.5<L>   9.32 >-----------< 269    (  @ 06:00 )             28.1<L>                        10.2<L>   9.62 >-----------< 303    (  @ 08:21 )             32.0<L>                    A/P: 25yo POD#1 s/p LTCS for repeat . Normal EBL.  Patient is stable and doing well post-operatively.    - Continue regular diet.  - Increase ambulation.  - Continue motrin, tylenol, oxycodone PRN for pain control.  vs. continue PCEA for pain.  - F/u AM CBC    Niharika Tam PGY-1  #[id number] OB Progress Note:  Delivery, POD#1    S: 25yo POD#1 s/p LTCS for repeat . Her pain is well controlled, with only some pain around the surgical site. She is tolerating a regular diet. Had one episode of vomiting after drinking water yesterday afternoon, but was able to eat dinner w/o nausea or vomiting. Not yet passing flatus. Denies any heavy vaginal bleeding.     Denies N/V. Denies CP/SOB/lightheadedness/dizziness. She is ambulating without difficulty. Voiding spontaneously. Denies HA, changes in vision, and RUQ pain.     O:   Vital Signs Last 24 Hrs  T(C): 37.1 (03 Dec 2019 06:49), Max: 37.1 (03 Dec 2019 06:49)  T(F): 98.8 (03 Dec 2019 06:49), Max: 98.8 (03 Dec 2019 06:49)  HR: 86 (03 Dec 2019 06:49) (63 - 90)  BP: 102/63 (03 Dec 2019 06:49) (99/62 - 132/98)  BP(mean): 71 (02 Dec 2019 14:00) (63 - 105)  RR: 19 (03 Dec 2019 06:49) (12 - 23)  SpO2: 98% (03 Dec 2019 06:49) (94% - 100%)    Physical exam:  General: NAD  Abdomen: Mildly distended, appropriately tender, incision c/d/i. Steri-strips in place   Extremities: No erythema, no pitting edema    Labs:  Blood type: O Positive  Rubella IgG: RPR: Negative                          8.5<L>   9.32 >-----------< 269    (  @ 06:00 )             28.1<L>                        10.2<L>   9.62 >-----------< 303    (  @ 08:21 )             32.0<L>

## 2019-12-03 NOTE — PROGRESS NOTE ADULT - PROBLEM SELECTOR PLAN 1
- Continue regular diet.  - Increase ambulation, Heparin for DVT prophylaxis   - Continue motrin, tylenol, oxycodone PRN for pain control.    - F/u AM CBC    Jose M Murphy PGY1

## 2019-12-03 NOTE — PROGRESS NOTE ADULT - SUBJECTIVE AND OBJECTIVE BOX
Postpartum Note,  Section  She is a  24y woman who is now post-operative day: 1  HPI:  23yo female   EDC 19  presents@39.2wks for scheduled  rltcs  AP course  unremarkable except for fetal alert, VSD  Denies VB,ROM or UCs today.  +FM (02 Dec 2019 08:34)    Subjective:  The patient feels well.  She is ambulating.   She is tolerating regular diet.  She denies nausea and vomiting.    Her pain is controlled.  She reports normal postpartum bleeding.  She is breastfeeding.  She is formula feeding.    Physical exam  Vital Signs Last 24 Hrs  T(C): 36.7 (03 Dec 2019 02:00), Max: 36.8 (02 Dec 2019 08:13)  T(F): 98 (03 Dec 2019 02:00), Max: 98.3 (02 Dec 2019 18:00)  HR: 90 (03 Dec 2019 02:00) (63 - 90)  BP: 117/76 (03 Dec 2019 02:00) (99/62 - 132/98)  BP(mean): 71 (02 Dec 2019 14:00) (63 - 105)  RR: 18 (03 Dec 2019 02:00) (12 - 23)  SpO2: 99% (03 Dec 2019 02:00) (94% - 100%)          Gen: NAD  Breast: Soft, nontender, not engorged.  Abdomen: Soft, nontender, no distension , firm uterine fundus at umbilicus.  Incision: Clean, dry, and intact with steri strips,had no bowel movement  Pelvic: Normal lochia noted  Ext: No calf tenderness    LABS:                              10.2   9.62  )-----------( 303      ( 02 Dec 2019 08:21 )             32.0     Rubella status: immune    no known allergies  Assessment and Plan  POD # s/p  section day 1  Doing well.  Encourage ambulation.

## 2019-12-03 NOTE — PROGRESS NOTE ADULT - ASSESSMENT
Pain Service Follow-up  Postop Day  1    S/P  C- Section    T(C): 37.1 (12-03-19 @ 06:49), Max: 37.1 (12-03-19 @ 06:49)  HR: 86 (12-03-19 @ 06:49) (63 - 90)  BP: 102/63 (12-03-19 @ 06:49) (99/62 - 132/98)  RR: 19 (12-03-19 @ 06:49) (16 - 23)  SpO2: 98% (12-03-19 @ 06:49) (94% - 100%)  Wt(kg): --      THERAPY:  Spinal Morphine     Sedation Score:	  [X] Alert	      [  ] Drowsy       [  ] Arousable	[  ] Asleep         [  ] Unresponsive    Side Effects:	  [X] None	      [  ] Nausea       [  ] Pruritus        [  ] Weakness   [  ] Numbness        ASSESSMENT/ PLAN   [ X ] Discontinue         [  ] Continue    [ X ] Documentation and Verification of current medications       Satisfactory Post Anesthetic Course
23yo POD#1 s/p LTCS for repeat . Patient is stable and doing well post-operatively.

## 2019-12-04 PROCEDURE — 99232 SBSQ HOSP IP/OBS MODERATE 35: CPT

## 2019-12-04 RX ORDER — SENNA PLUS 8.6 MG/1
2 TABLET ORAL AT BEDTIME
Refills: 0 | Status: DISCONTINUED | OUTPATIENT
Start: 2019-12-04 | End: 2019-12-05

## 2019-12-04 RX ORDER — OXYCODONE HYDROCHLORIDE 5 MG/1
5 TABLET ORAL
Refills: 0 | Status: DISCONTINUED | OUTPATIENT
Start: 2019-12-04 | End: 2019-12-05

## 2019-12-04 RX ADMIN — SIMETHICONE 80 MILLIGRAM(S): 80 TABLET, CHEWABLE ORAL at 07:48

## 2019-12-04 RX ADMIN — Medication 325 MILLIGRAM(S): at 04:49

## 2019-12-04 RX ADMIN — Medication 600 MILLIGRAM(S): at 00:10

## 2019-12-04 RX ADMIN — Medication 975 MILLIGRAM(S): at 10:11

## 2019-12-04 RX ADMIN — Medication 600 MILLIGRAM(S): at 18:39

## 2019-12-04 RX ADMIN — Medication 600 MILLIGRAM(S): at 12:22

## 2019-12-04 RX ADMIN — OXYCODONE HYDROCHLORIDE 5 MILLIGRAM(S): 5 TABLET ORAL at 08:30

## 2019-12-04 RX ADMIN — Medication 975 MILLIGRAM(S): at 10:50

## 2019-12-04 RX ADMIN — OXYCODONE HYDROCHLORIDE 5 MILLIGRAM(S): 5 TABLET ORAL at 07:48

## 2019-12-04 RX ADMIN — HEPARIN SODIUM 5000 UNIT(S): 5000 INJECTION INTRAVENOUS; SUBCUTANEOUS at 04:49

## 2019-12-04 RX ADMIN — Medication 500 MILLIGRAM(S): at 17:27

## 2019-12-04 RX ADMIN — Medication 600 MILLIGRAM(S): at 05:18

## 2019-12-04 RX ADMIN — SENNA PLUS 2 TABLET(S): 8.6 TABLET ORAL at 04:52

## 2019-12-04 RX ADMIN — HEPARIN SODIUM 5000 UNIT(S): 5000 INJECTION INTRAVENOUS; SUBCUTANEOUS at 17:27

## 2019-12-04 RX ADMIN — Medication 325 MILLIGRAM(S): at 17:27

## 2019-12-04 RX ADMIN — Medication 600 MILLIGRAM(S): at 13:00

## 2019-12-04 RX ADMIN — Medication 600 MILLIGRAM(S): at 04:49

## 2019-12-04 RX ADMIN — Medication 600 MILLIGRAM(S): at 23:39

## 2019-12-04 NOTE — PROGRESS NOTE ADULT - SUBJECTIVE AND OBJECTIVE BOX
Postpartum Note,  Section  She is a  24y woman who is now post-operative day: 2  HPI:  25yo female   EDC 19  presents@39.2wks for scheduled  rltcs  AP course  unremarkable except for fetal alert, VSD  Denies VB,ROM or UCs today.  +FM (02 Dec 2019 08:34)    Subjective:  The patient feels well.  She is ambulating.   She is tolerating regular diet.  She denies nausea and vomiting.    Her pain is controlled.  She reports normal postpartum bleeding.  She is breastfeeding.  She is formula feeding.    Physical exam  Vital Signs Last 24 Hrs  T(C): 36.7 (04 Dec 2019 06:50), Max: 36.7 (04 Dec 2019 06:50)  T(F): 98 (04 Dec 2019 06:50), Max: 98 (04 Dec 2019 06:50)  HR: 72 (04 Dec 2019 06:50) (72 - 97)  BP: 109/79 (04 Dec 2019 06:50) (109/79 - 123/66)  BP(mean): --  RR: 19 (04 Dec 2019 06:50) (18 - 19)  SpO2: 99% (04 Dec 2019 06:50) (99% - 100%)          Gen: NAD  Breast: Soft, nontender, not engorged.  Abdomen: Soft, nontender, no distension , firm uterine fundus at umbilicus.  Incision: Clean, dry, and intact with steri strips,had no bowel movement  Pelvic: Normal lochia noted  Ext: No calf tenderness    LABS:                              8.5    9.32  )-----------( 269      ( 03 Dec 2019 06:00 )             28.1     Rubella status: immune    no known allergies  Assessment and Plan  POD # s/p  section day2  Doing well.  Encourage ambulation.d/c home tomorrow

## 2019-12-05 VITALS
RESPIRATION RATE: 18 BRPM | DIASTOLIC BLOOD PRESSURE: 71 MMHG | SYSTOLIC BLOOD PRESSURE: 115 MMHG | HEART RATE: 87 BPM | TEMPERATURE: 98 F | OXYGEN SATURATION: 99 %

## 2019-12-05 RX ADMIN — Medication 500 MILLIGRAM(S): at 12:03

## 2019-12-05 RX ADMIN — Medication 600 MILLIGRAM(S): at 12:03

## 2019-12-05 RX ADMIN — Medication 1 TABLET(S): at 12:03

## 2019-12-05 RX ADMIN — Medication 975 MILLIGRAM(S): at 04:37

## 2019-12-05 RX ADMIN — HEPARIN SODIUM 5000 UNIT(S): 5000 INJECTION INTRAVENOUS; SUBCUTANEOUS at 04:31

## 2019-12-05 RX ADMIN — Medication 600 MILLIGRAM(S): at 12:50

## 2019-12-05 RX ADMIN — Medication 325 MILLIGRAM(S): at 12:03

## 2019-12-05 RX ADMIN — Medication 975 MILLIGRAM(S): at 05:00

## 2019-12-05 NOTE — PROGRESS NOTE ADULT - SUBJECTIVE AND OBJECTIVE BOX
Postpartum Note,  Section  She is a  24y woman who is now post-operative day: 3  HPI:  23yo female   EDC 19  presents@39.2wks for scheduled  rltcs  AP course  unremarkable except for fetal alert, VSD  Denies VB,ROM or UCs today.  +FM (02 Dec 2019 08:34)    Subjective:  The patient feels well.  She is ambulating.   She is tolerating regular diet.  She denies nausea and vomiting.    Her pain is controlled.  She reports normal postpartum bleeding.  She is breastfeeding.  She is formula feeding.    Physical exam  Vital Signs Last 24 Hrs  T(C): 36.7 (05 Dec 2019 06:47), Max: 36.8 (04 Dec 2019 14:09)  T(F): 98.1 (05 Dec 2019 06:47), Max: 98.2 (04 Dec 2019 14:09)  HR: 87 (05 Dec 2019 06:47) (87 - 89)  BP: 115/71 (05 Dec 2019 06:47) (113/71 - 115/71)  BP(mean): --  RR: 18 (05 Dec 2019 06:47) (16 - 18)  SpO2: 99% (05 Dec 2019 06:47) (99% - 100%)          Gen: NAD  Breast: Soft, nontender, not engorged.  Abdomen: Soft, nontender, no distension , firm uterine fundus at umbilicus.  Incision: Clean, dry, and intact with steri strips,had her bowel movement  Pelvic: Normal lochia noted  Ext: No calf tenderness    LABS:          Rubella status: immune    no known allergies  Assessment and Plan  POD # s/p  section day3  Doing well.  Encourage ambulation.d/c home

## 2019-12-11 LAB — SURGICAL PATHOLOGY STUDY: SIGNIFICANT CHANGE UP

## 2019-12-12 ENCOUNTER — NON-APPOINTMENT (OUTPATIENT)
Age: 24
End: 2019-12-12

## 2019-12-18 ENCOUNTER — APPOINTMENT (OUTPATIENT)
Dept: OBGYN | Facility: CLINIC | Age: 24
End: 2019-12-18

## 2019-12-18 VITALS
SYSTOLIC BLOOD PRESSURE: 130 MMHG | DIASTOLIC BLOOD PRESSURE: 93 MMHG | TEMPERATURE: 97.6 F | BODY MASS INDEX: 29.77 KG/M2 | WEIGHT: 168 LBS | OXYGEN SATURATION: 98 % | HEIGHT: 63 IN | HEART RATE: 82 BPM

## 2019-12-18 DIAGNOSIS — Z34.92 ENCOUNTER FOR SUPERVISION OF NORMAL PREGNANCY, UNSPECIFIED, SECOND TRIMESTER: ICD-10-CM

## 2019-12-18 DIAGNOSIS — Z34.90 ENCOUNTER FOR SUPERVISION OF NORMAL PREGNANCY, UNSPECIFIED, UNSPECIFIED TRIMESTER: ICD-10-CM

## 2019-12-18 DIAGNOSIS — Z32.01 ENCOUNTER FOR PREGNANCY TEST, RESULT POSITIVE: ICD-10-CM

## 2019-12-18 DIAGNOSIS — O28.3 ABNORMAL ULTRASONIC FINDING ON ANTENATAL SCREENING OF MOTHER: ICD-10-CM

## 2019-12-18 DIAGNOSIS — O99.013 ANEMIA COMPLICATING PREGNANCY, THIRD TRIMESTER: ICD-10-CM

## 2019-12-18 RX ORDER — PRENATAL VIT NO.130/IRON/FOLIC 27MG-0.8MG
27-0.8 TABLET ORAL DAILY
Qty: 30 | Refills: 6 | Status: COMPLETED | COMMUNITY
Start: 2019-09-10 | End: 2019-12-18

## 2019-12-18 RX ORDER — IBUPROFEN 600 MG/1
600 TABLET, FILM COATED ORAL
Qty: 1 | Refills: 0 | Status: COMPLETED | COMMUNITY
Start: 2019-12-04 | End: 2019-12-18

## 2019-12-18 RX ORDER — ACETAMINOPHEN 500 MG/1
500 TABLET ORAL
Qty: 60 | Refills: 0 | Status: COMPLETED | COMMUNITY
Start: 2019-12-04 | End: 2019-12-18

## 2019-12-18 RX ORDER — INFLUENZA A VIRUS A/CALIFORNIA/7/2009 X-179A (H1N1) ANTIGEN (FORMALDEHYDE INACTIVATED), INFLUENZA A VIRUS A/SWITZERLAND/9715293/2013 NIB-88 (H3N2) ANTIGEN (FORMALDEHYDE INACTIVATED), INFLUENZA B VIRUS B/PHUKET/3073/2013 ANTIGEN (FORMALDEHYDE INACTIVATED), AND INFLUENZA B VIRUS B/BRISBANE/60/2008 ANTIGEN (FORMALDEHYDE INACTIVATED) 15; 15; 15; 15 UG/.5ML; UG/.5ML; UG/.5ML; UG/.5ML
INJECTION, SUSPENSION INTRAMUSCULAR
Qty: 1 | Refills: 0 | Status: DISCONTINUED | COMMUNITY
Start: 2019-09-23 | End: 2019-12-18

## 2020-01-11 ENCOUNTER — NON-APPOINTMENT (OUTPATIENT)
Age: 25
End: 2020-01-11

## 2020-01-15 ENCOUNTER — APPOINTMENT (OUTPATIENT)
Dept: OBGYN | Facility: CLINIC | Age: 25
End: 2020-01-15
Payer: MEDICAID

## 2020-01-15 VITALS
TEMPERATURE: 97.9 F | DIASTOLIC BLOOD PRESSURE: 65 MMHG | BODY MASS INDEX: 27.82 KG/M2 | OXYGEN SATURATION: 98 % | WEIGHT: 157 LBS | HEART RATE: 87 BPM | HEIGHT: 63 IN | SYSTOLIC BLOOD PRESSURE: 118 MMHG

## 2020-01-15 DIAGNOSIS — Z09 ENCOUNTER FOR FOLLOW-UP EXAMINATION AFTER COMPLETED TREATMENT FOR CONDITIONS OTHER THAN MALIGNANT NEOPLASM: ICD-10-CM

## 2020-01-15 PROCEDURE — 81025 URINE PREGNANCY TEST: CPT

## 2020-01-15 PROCEDURE — 99024 POSTOP FOLLOW-UP VISIT: CPT | Mod: TH

## 2020-01-15 RX ORDER — PRENATAL VIT,CAL 76/IRON/FOLIC 29 MG-1 MG
29-1 TABLET ORAL
Refills: 0 | Status: COMPLETED | COMMUNITY
End: 2020-01-15

## 2020-01-15 NOTE — HISTORY OF PRESENT ILLNESS
[Postpartum Follow Up] : postpartum follow up [Complications:___] : no complications [Last Pap Date: ___] : Last Pap Date: [unfilled] [Delivery Date: ___] : on [unfilled] [Repeat C/S] : delivered by  section (repeat) [Female] : Delivery History: baby girl [Wt. ___] : weighing [unfilled] [Rhogam] : Rhogam was not administered [Pertussis Vaccine] : Pertussis vaccine was not administered [Rubella Vaccine] : Rubella vaccine was not administered [BTL] : no tubal ligation [Discharge HGB: ___] : hemoglobin level was [unfilled] [Breastfeeding] : not currently nursing [Resumed Ramsey] : has not resumed intercourse [Resumed Menses] : has not resumed her menses [Intended Contraception] : Intended Contraception: [Oral Contraceptives] : oral contraceptives [Abdominal Pain] : no abdominal pain [Back Pain] : no back pain [Chest Pain] : no chest pain [BreastFeeding Problems] : no breastfeeding problems [Breast Pain] : no breast pain [Cracked Nipples] : no cracked nipples [S/Sx PP Depression] : no signs/symptoms of postpartum depression [Incisional Drainage] : no incisional drainage [Episiotomy Site Pain] : no episiotomy site pain [Heavy Bleeding] : no heavy bleeding [Irregular Bleeding] : no irregular bleeding [Leg Pain] : no leg pain [Shortness of Breath] : no shortness of breath [Incisional Pain] : no incisional pain [Vaginal Discharge] : no vaginal discharge [Suicidal Ideation] : no suicidal ideation [Healed] : healed [Clean/Dry/Intact] : clean, dry and intact [Back to Normal] : is back to normal in size [None] : no vaginal bleeding [Cervix Sample Taken] : cervical sample not taken for a Pap smear [Normal] : the vagina was normal [Not Done] : Examination of breasts not done

## 2020-01-28 ENCOUNTER — APPOINTMENT (OUTPATIENT)
Dept: OBGYN | Facility: CLINIC | Age: 25
End: 2020-01-28

## 2020-01-28 VITALS
HEART RATE: 97 BPM | HEIGHT: 63 IN | WEIGHT: 156 LBS | DIASTOLIC BLOOD PRESSURE: 74 MMHG | TEMPERATURE: 98 F | BODY MASS INDEX: 27.64 KG/M2 | SYSTOLIC BLOOD PRESSURE: 107 MMHG

## 2020-07-07 RX ORDER — CHLORHEXIDINE GLUCONATE 4 %
325 (65 FE) LIQUID (ML) TOPICAL TWICE DAILY
Qty: 60 | Refills: 3 | Status: DISCONTINUED | COMMUNITY
Start: 2019-09-23 | End: 2020-07-07

## 2020-07-20 ENCOUNTER — APPOINTMENT (OUTPATIENT)
Dept: OBGYN | Facility: CLINIC | Age: 25
End: 2020-07-20
Payer: MEDICAID

## 2020-07-20 VITALS
SYSTOLIC BLOOD PRESSURE: 102 MMHG | OXYGEN SATURATION: 98 % | TEMPERATURE: 98 F | HEART RATE: 87 BPM | DIASTOLIC BLOOD PRESSURE: 68 MMHG | WEIGHT: 156 LBS | BODY MASS INDEX: 27.64 KG/M2 | HEIGHT: 63 IN

## 2020-07-20 PROCEDURE — 99395 PREV VISIT EST AGE 18-39: CPT

## 2020-07-20 NOTE — PHYSICAL EXAM
[Awake] : awake [Acute Distress] : no acute distress [Alert] : alert [Mass] : no breast mass [Nipple Discharge] : no nipple discharge [Axillary LAD] : no axillary lymphadenopathy [Oriented x3] : oriented to person, place, and time [Tender] : non tender [Soft] : soft [Normal] : vagina [No Bleeding] : there was no active vaginal bleeding [Discharge] : had a ~M discharge [Scant] : scant [Foul Smelling] : not foul smelling [White] : white [Thick] : thick [Uterine Adnexae] : were not tender and not enlarged [Pap Obtained] : a Pap smear was performed

## 2020-07-20 NOTE — HISTORY OF PRESENT ILLNESS
[Normal Amount/Duration] : was of a normal amount and duration [Definite:  ___ (Date)] : the last menstrual period was [unfilled] [Menarche Age: ____] : age at menarche was [unfilled] [Regular Cycle Intervals] : periods have been regular [Male ___] : [unfilled] male [Sexually Active] : is sexually active [Yes] : yes

## 2020-07-21 LAB
C TRACH RRNA SPEC QL NAA+PROBE: NOT DETECTED
N GONORRHOEA RRNA SPEC QL NAA+PROBE: NOT DETECTED
SOURCE TP AMPLIFICATION: NORMAL

## 2020-07-24 LAB — CYTOLOGY CVX/VAG DOC THIN PREP: NORMAL

## 2020-09-02 NOTE — OB RN INTRAOPERATIVE NOTE - NSRNPREP_OBGYN_ALL_OB
In Encounter please print from [facility] MISC - PLEASE PRINT out of the Med Info Form    Add any additional information if needed and once the Miscellaneous Form is reviewed; please sign and date.     Then please send back to Medical Release via interoffice mail, fax, or e-mail back to: [email or web address]     Fax #: [phone]    Thank you, Medical Release.      Yes

## 2021-07-21 ENCOUNTER — RX RENEWAL (OUTPATIENT)
Age: 26
End: 2021-07-21

## 2021-07-21 RX ORDER — NORGESTIMATE AND ETHINYL ESTRADIOL 7DAYSX3 28
0.18/0.215/0.25 KIT ORAL DAILY
Qty: 1 | Refills: 5 | Status: COMPLETED | COMMUNITY
Start: 2020-07-20 | End: 2021-07-21

## 2021-07-29 ENCOUNTER — APPOINTMENT (OUTPATIENT)
Dept: OBGYN | Facility: CLINIC | Age: 26
End: 2021-07-29
Payer: MEDICAID

## 2021-07-29 VITALS
WEIGHT: 153 LBS | DIASTOLIC BLOOD PRESSURE: 78 MMHG | BODY MASS INDEX: 27.11 KG/M2 | SYSTOLIC BLOOD PRESSURE: 118 MMHG | OXYGEN SATURATION: 97 % | HEIGHT: 63 IN | HEART RATE: 99 BPM | TEMPERATURE: 94.2 F

## 2021-07-29 DIAGNOSIS — Z30.41 ENCOUNTER FOR SURVEILLANCE OF CONTRACEPTIVE PILLS: ICD-10-CM

## 2021-07-29 PROCEDURE — 99395 PREV VISIT EST AGE 18-39: CPT

## 2021-07-29 RX ORDER — NORGESTIMATE AND ETHINYL ESTRADIOL 7DAYSX3 28
0.18/0.215/0.25 KIT ORAL DAILY
Qty: 1 | Refills: 5 | Status: ACTIVE | COMMUNITY
Start: 2020-01-15 | End: 1900-01-01

## 2021-07-29 NOTE — HISTORY OF PRESENT ILLNESS
[Normal Amount/Duration] :  normal amount and duration [Regular Cycle Intervals] : periods have been regular [Menarche Age: ____] : age at menarche was [unfilled] [FreeTextEntry1] : 07/25/2021 [Currently Active] : currently active [Men] : men [Vaginal] : vaginal [No] : No

## 2024-08-08 NOTE — OB RN PATIENT PROFILE - BRADEN SCORE (IF 18 OR LESS ACTIVATE SKIN INJURY RISK INCREASED GUIDELINE), MLM
Refills have been requested for the following medications:         morphine (MS CONTIN) 15 MG CR tablet [Chanel Bruno]         HYDROcodone-acetaminophen (NORCO) 5-325 MG tablet [Chanel Bruno]     Preferred pharmacy: [facility] [id number] [place], [place] - [address]   23
